# Patient Record
Sex: MALE | Race: WHITE | Employment: FULL TIME | URBAN - METROPOLITAN AREA
[De-identification: names, ages, dates, MRNs, and addresses within clinical notes are randomized per-mention and may not be internally consistent; named-entity substitution may affect disease eponyms.]

---

## 2018-11-15 ENCOUNTER — OFFICE VISIT (OUTPATIENT)
Dept: INTERNAL MEDICINE CLINIC | Age: 43
End: 2018-11-15
Payer: COMMERCIAL

## 2018-11-15 VITALS
TEMPERATURE: 97.9 F | DIASTOLIC BLOOD PRESSURE: 72 MMHG | HEART RATE: 121 BPM | WEIGHT: 181 LBS | SYSTOLIC BLOOD PRESSURE: 110 MMHG | OXYGEN SATURATION: 96 % | BODY MASS INDEX: 27.12 KG/M2

## 2018-11-15 DIAGNOSIS — N41.1 CHRONIC PROSTATITIS: Primary | ICD-10-CM

## 2018-11-15 DIAGNOSIS — G89.29 CHRONIC MIDLINE LOW BACK PAIN WITHOUT SCIATICA: ICD-10-CM

## 2018-11-15 DIAGNOSIS — M54.50 CHRONIC MIDLINE LOW BACK PAIN WITHOUT SCIATICA: ICD-10-CM

## 2018-11-15 LAB
BILIRUBIN, POC: NORMAL
BLOOD URINE, POC: NORMAL
CLARITY, POC: CLEAR
COLOR, POC: YELLOW
GLUCOSE URINE, POC: NORMAL
KETONES, POC: NORMAL
LEUKOCYTE EST, POC: NORMAL
NITRITE, POC: NORMAL
PH, POC: 6
PROTEIN, POC: NORMAL
SPECIFIC GRAVITY, POC: 1.02
UROBILINOGEN, POC: 0.2

## 2018-11-15 PROCEDURE — 99214 OFFICE O/P EST MOD 30 MIN: CPT | Performed by: INTERNAL MEDICINE

## 2018-11-15 PROCEDURE — 81002 URINALYSIS NONAUTO W/O SCOPE: CPT | Performed by: INTERNAL MEDICINE

## 2018-11-15 RX ORDER — TRAMADOL HYDROCHLORIDE 50 MG/1
50 TABLET ORAL EVERY 6 HOURS PRN
Qty: 20 TABLET | Refills: 0 | Status: SHIPPED | OUTPATIENT
Start: 2018-11-15 | End: 2018-11-27 | Stop reason: SDUPTHER

## 2018-11-15 RX ORDER — CIPROFLOXACIN 500 MG/1
500 TABLET, FILM COATED ORAL 2 TIMES DAILY
Qty: 60 TABLET | Refills: 0 | Status: SHIPPED | OUTPATIENT
Start: 2018-11-15 | End: 2018-11-25

## 2018-11-15 ASSESSMENT — ENCOUNTER SYMPTOMS
CONSTIPATION: 0
RESPIRATORY NEGATIVE: 1
BACK PAIN: 1
GASTROINTESTINAL NEGATIVE: 1

## 2018-11-15 NOTE — PROGRESS NOTES
Subjective:      Patient ID: Huy Rendon is a 37 y.o. male. Patient presents with:  Back Pain: lower back pain, radiates down into hips and down thighs. seen by chirpractor yesterday, not much relief. hx of prostatitis    Huy Rendon is a 37 y.o. male with the following history as recorded in [employer]:  Patient Active Problem List    Prostatitis      No current outpatient prescriptions on file. No current facility-administered medications for this visit. Allergies: Patient has no known allergies. Past Medical History:  2006: BPH  No date: Prostatitis  History reviewed. No pertinent surgical history. Review of patient's family history indicates:  Problem: Birth Defects      Relation: Paternal Grandmother       Age of Onset: 72       Comment: prostate cancer    Smoking status: Current Every Day Smoker                                                   Packs/day: 1.50      Years: 21.00     Smokeless tobacco: Never Used                      Alcohol use: Yes              Comment: occ        Back Pain   This is a recurrent problem. The problem occurs constantly. The problem has been gradually worsening since onset. The pain is present in the gluteal. The quality of the pain is described as aching. Radiates to: peroneal area. The pain is moderate. The symptoms are aggravated by standing. Review of Systems   Constitutional: Positive for fatigue. HENT: Negative. Respiratory: Negative. Cardiovascular: Negative. Gastrointestinal: Negative. Negative for constipation. Genitourinary: Positive for frequency. Negative for difficulty urinating. Musculoskeletal: Positive for back pain. Skin: Negative. Psychiatric/Behavioral: Negative. Objective:   Physical Exam   HENT:   Head: Normocephalic. Right Ear: External ear normal.   Left Ear: External ear normal.   Nose: Nose normal.   Mouth/Throat: Oropharynx is clear and moist. No oropharyngeal exudate.    Eyes: Pupils are equal,

## 2018-11-19 ENCOUNTER — TELEPHONE (OUTPATIENT)
Dept: INTERNAL MEDICINE CLINIC | Age: 43
End: 2018-11-19

## 2018-11-27 ENCOUNTER — OFFICE VISIT (OUTPATIENT)
Dept: INTERNAL MEDICINE CLINIC | Age: 43
End: 2018-11-27
Payer: COMMERCIAL

## 2018-11-27 VITALS
OXYGEN SATURATION: 96 % | SYSTOLIC BLOOD PRESSURE: 118 MMHG | HEART RATE: 83 BPM | WEIGHT: 181 LBS | DIASTOLIC BLOOD PRESSURE: 66 MMHG | BODY MASS INDEX: 27.12 KG/M2 | TEMPERATURE: 98.7 F

## 2018-11-27 DIAGNOSIS — N41.1 CHRONIC PROSTATITIS: Primary | ICD-10-CM

## 2018-11-27 LAB
BILIRUBIN, POC: NORMAL
BLOOD URINE, POC: NORMAL
CLARITY, POC: NORMAL
COLOR, POC: YELLOW
GLUCOSE URINE, POC: NORMAL
KETONES, POC: NORMAL
LEUKOCYTE EST, POC: NORMAL
NITRITE, POC: NORMAL
PH, POC: 5
PROTEIN, POC: NORMAL
SPECIFIC GRAVITY, POC: 1.02
UROBILINOGEN, POC: 0.2

## 2018-11-27 PROCEDURE — 99213 OFFICE O/P EST LOW 20 MIN: CPT | Performed by: INTERNAL MEDICINE

## 2018-11-27 PROCEDURE — 81002 URINALYSIS NONAUTO W/O SCOPE: CPT | Performed by: INTERNAL MEDICINE

## 2018-11-27 RX ORDER — DOXYCYCLINE HYCLATE 100 MG
100 TABLET ORAL 2 TIMES DAILY
Qty: 30 TABLET | Refills: 0 | Status: SHIPPED | OUTPATIENT
Start: 2018-11-27 | End: 2018-12-08 | Stop reason: SDUPTHER

## 2018-11-27 RX ORDER — TRAMADOL HYDROCHLORIDE 50 MG/1
50 TABLET ORAL EVERY 6 HOURS PRN
Qty: 20 TABLET | Refills: 0 | Status: SHIPPED | OUTPATIENT
Start: 2018-11-27 | End: 2018-12-02

## 2018-11-27 ASSESSMENT — PATIENT HEALTH QUESTIONNAIRE - PHQ9
SUM OF ALL RESPONSES TO PHQ QUESTIONS 1-9: 0
SUM OF ALL RESPONSES TO PHQ QUESTIONS 1-9: 0
1. LITTLE INTEREST OR PLEASURE IN DOING THINGS: 0
SUM OF ALL RESPONSES TO PHQ9 QUESTIONS 1 & 2: 0
2. FEELING DOWN, DEPRESSED OR HOPELESS: 0

## 2018-12-08 DIAGNOSIS — N41.1 CHRONIC PROSTATITIS: ICD-10-CM

## 2018-12-10 RX ORDER — DOXYCYCLINE HYCLATE 100 MG
TABLET ORAL
Qty: 30 TABLET | Refills: 0 | Status: SHIPPED | OUTPATIENT
Start: 2018-12-10 | End: 2019-01-07 | Stop reason: SDUPTHER

## 2019-01-07 DIAGNOSIS — N41.1 CHRONIC PROSTATITIS: ICD-10-CM

## 2019-01-07 RX ORDER — DOXYCYCLINE HYCLATE 100 MG
TABLET ORAL
Qty: 30 TABLET | Refills: 0 | Status: SHIPPED | OUTPATIENT
Start: 2019-01-07 | End: 2019-10-24

## 2019-04-11 NOTE — LETTER
[facility]  [address] [place] [id number]  Phone: [phone]  Fax: [phone]    Mickie Shelton MD        November 15, 2018     Patient: Jorge Michael   YOB: 1975   Date of Visit: 11/15/2018       To Whom It May Concern: It is my medical opinion that Constantino Pullman may return to work on 11-. .    If you have any questions or concerns, please don't hesitate to call.     Sincerely,          Mickie Shelton MD Eating, ambulating, has BMs.   Feels ready to go home.   Full DCS to follow.

## 2019-10-24 ENCOUNTER — OFFICE VISIT (OUTPATIENT)
Dept: INTERNAL MEDICINE CLINIC | Age: 44
End: 2019-10-24
Payer: COMMERCIAL

## 2019-10-24 VITALS
OXYGEN SATURATION: 97 % | BODY MASS INDEX: 28.02 KG/M2 | WEIGHT: 187 LBS | DIASTOLIC BLOOD PRESSURE: 76 MMHG | HEART RATE: 101 BPM | SYSTOLIC BLOOD PRESSURE: 124 MMHG | TEMPERATURE: 97.9 F

## 2019-10-24 DIAGNOSIS — L02.91 ABSCESS: Primary | ICD-10-CM

## 2019-10-24 DIAGNOSIS — R59.1 LYMPHADENOPATHY: ICD-10-CM

## 2019-10-24 PROCEDURE — 99214 OFFICE O/P EST MOD 30 MIN: CPT | Performed by: INTERNAL MEDICINE

## 2019-10-24 RX ORDER — HYDROCODONE BITARTRATE AND ACETAMINOPHEN 5; 300 MG/1; MG/1
1 TABLET ORAL 2 TIMES DAILY PRN
Qty: 6 TABLET | Refills: 0 | Status: SHIPPED | OUTPATIENT
Start: 2019-10-24 | End: 2019-10-27

## 2019-10-24 RX ORDER — SULFAMETHOXAZOLE AND TRIMETHOPRIM 800; 160 MG/1; MG/1
1 TABLET ORAL 2 TIMES DAILY
Qty: 14 TABLET | Refills: 0 | Status: SHIPPED | OUTPATIENT
Start: 2019-10-24 | End: 2019-11-21 | Stop reason: SDUPTHER

## 2019-10-24 RX ORDER — CEPHALEXIN 500 MG/1
500 CAPSULE ORAL 3 TIMES DAILY
Qty: 21 CAPSULE | Refills: 0 | Status: SHIPPED | OUTPATIENT
Start: 2019-10-24 | End: 2019-11-21 | Stop reason: SDUPTHER

## 2019-10-24 ASSESSMENT — ENCOUNTER SYMPTOMS
ABDOMINAL PAIN: 1
COLOR CHANGE: 1

## 2019-11-20 ENCOUNTER — TELEPHONE (OUTPATIENT)
Dept: INTERNAL MEDICINE CLINIC | Age: 44
End: 2019-11-20

## 2019-11-20 DIAGNOSIS — L02.91 ABSCESS: ICD-10-CM

## 2019-11-21 RX ORDER — SULFAMETHOXAZOLE AND TRIMETHOPRIM 800; 160 MG/1; MG/1
1 TABLET ORAL 2 TIMES DAILY
Qty: 14 TABLET | Refills: 0 | Status: SHIPPED | OUTPATIENT
Start: 2019-11-21 | End: 2019-11-28

## 2019-11-21 RX ORDER — CEPHALEXIN 500 MG/1
500 CAPSULE ORAL 3 TIMES DAILY
Qty: 21 CAPSULE | Refills: 0 | Status: SHIPPED | OUTPATIENT
Start: 2019-11-21 | End: 2019-11-28

## 2021-10-28 ENCOUNTER — OFFICE VISIT (OUTPATIENT)
Dept: FAMILY MEDICINE CLINIC | Age: 46
End: 2021-10-28
Payer: COMMERCIAL

## 2021-10-28 VITALS
OXYGEN SATURATION: 93 % | HEART RATE: 101 BPM | HEIGHT: 69 IN | TEMPERATURE: 99.6 F | WEIGHT: 185 LBS | SYSTOLIC BLOOD PRESSURE: 100 MMHG | DIASTOLIC BLOOD PRESSURE: 60 MMHG | BODY MASS INDEX: 27.4 KG/M2

## 2021-10-28 DIAGNOSIS — G89.18 POST-OP PAIN: Primary | ICD-10-CM

## 2021-10-28 DIAGNOSIS — T81.40XA POSTOPERATIVE INFECTION, UNSPECIFIED TYPE, INITIAL ENCOUNTER: ICD-10-CM

## 2021-10-28 PROCEDURE — 99213 OFFICE O/P EST LOW 20 MIN: CPT | Performed by: NURSE PRACTITIONER

## 2021-10-28 SDOH — ECONOMIC STABILITY: FOOD INSECURITY: WITHIN THE PAST 12 MONTHS, YOU WORRIED THAT YOUR FOOD WOULD RUN OUT BEFORE YOU GOT MONEY TO BUY MORE.: PATIENT DECLINED

## 2021-10-28 SDOH — ECONOMIC STABILITY: FOOD INSECURITY: WITHIN THE PAST 12 MONTHS, THE FOOD YOU BOUGHT JUST DIDN'T LAST AND YOU DIDN'T HAVE MONEY TO GET MORE.: PATIENT DECLINED

## 2021-10-28 ASSESSMENT — ENCOUNTER SYMPTOMS
NAUSEA: 0
CONSTIPATION: 0
SHORTNESS OF BREATH: 0
COUGH: 0
WHEEZING: 0
DIARRHEA: 0
ABDOMINAL PAIN: 1

## 2021-10-28 ASSESSMENT — PATIENT HEALTH QUESTIONNAIRE - PHQ9
SUM OF ALL RESPONSES TO PHQ QUESTIONS 1-9: 0
1. LITTLE INTEREST OR PLEASURE IN DOING THINGS: 0
SUM OF ALL RESPONSES TO PHQ9 QUESTIONS 1 & 2: 0
2. FEELING DOWN, DEPRESSED OR HOPELESS: 0
SUM OF ALL RESPONSES TO PHQ QUESTIONS 1-9: 0
SUM OF ALL RESPONSES TO PHQ QUESTIONS 1-9: 0

## 2021-10-28 ASSESSMENT — SOCIAL DETERMINANTS OF HEALTH (SDOH): HOW HARD IS IT FOR YOU TO PAY FOR THE VERY BASICS LIKE FOOD, HOUSING, MEDICAL CARE, AND HEATING?: PATIENT DECLINED

## 2021-10-28 NOTE — PROGRESS NOTES
HISTORY AND PHYSICAL             Date: 10/28/2021        Patient Name: Megan Durant     YOB: 1975      Age:  55 y.o. Chief Complaint     Chief Complaint   Patient presents with   Elena Brandon Doctor    Follow-up     had surgery 10-20-21 appedectomy, 10-23-21 had a repair         History Obtained From   patient    History of Present Illness   Presents today to establish care. States he had appendectomy 10/20 and had to have correction surgery on 10/23 released on Monday. States he has had ongoing abd pain, generally through out abd.  small amt of po intake, drinking some fluids, urinating ok. Last BM this morning, normal.  Denies n/v/d. Fevers at home, weakness and fatigue, states he lost 15 lbs over the last week. Post op follow up scheduled for tomorrow, phone follow up. Completed oral antibiotics on Monday. pain at rest 5/10, with movement increases 9/10. Past Medical History     Past Medical History:   Diagnosis Date    BPH 2006    Prostatitis         Past Surgical History   No past surgical history on file. Medications Prior to Admission     Prior to Admission medications    Not on File        Allergies   Patient has no known allergies. Social History     Social History     Tobacco History     Smoking Status  Current Every Day Smoker Smoking Frequency  1.5 packs/day for 21 years (31.5 pk yrs)    Smokeless Tobacco Use  Never Used          Alcohol History     Alcohol Use Status  Yes Comment  occ          Drug Use     Drug Use Status  No          Sexual Activity     Sexually Active  Yes Partners  Female                Family History     Family History   Problem Relation Age of Onset    Birth Defects Paternal Grandmother 72        prostate cancer       Review of Systems   Review of Systems   Constitutional: Positive for activity change, fatigue, fever and unexpected weight change (lost 15lbs over the week).    Respiratory: Negative for cough, shortness of breath and wheezing. Cardiovascular: Negative for chest pain, palpitations and leg swelling. Gastrointestinal: Positive for abdominal pain. Negative for constipation, diarrhea and nausea. Genitourinary: Negative for difficulty urinating. Skin: Positive for wound (surgical incision). Neurological: Positive for weakness. Physical Exam   /60 (Site: Left Upper Arm, Position: Sitting, Cuff Size: Medium Adult)   Pulse 101   Temp 99.6 °F (37.6 °C) (Oral)   Ht 5' 8.5\" (1.74 m)   Wt 185 lb (83.9 kg)   SpO2 93%   BMI 27.72 kg/m²     Physical Exam  Constitutional:       General: He is not in acute distress. Appearance: He is ill-appearing. HENT:      Head: Normocephalic and atraumatic. Cardiovascular:      Rate and Rhythm: Regular rhythm. Tachycardia present. Heart sounds: Normal heart sounds. No murmur heard. Pulmonary:      Effort: Tachypnea present. Abdominal:      General: There is distension. Tenderness: There is abdominal tenderness (throughout). There is guarding. Musculoskeletal:      Right lower leg: No edema. Left lower leg: No edema. Skin:     General: Skin is warm and dry. Neurological:      Mental Status: He is alert. Labs      Office Visit on 11/27/2018   Component Date Value Ref Range Status    Color, UA 11/27/2018 yellow   Final    Clarity, UA 11/27/2018 clearn   Final    Glucose, UA POC 11/27/2018 n   Final    Bilirubin, UA 11/27/2018 n   Final    Ketones, UA 11/27/2018 n   Final    Spec Grav, UA 11/27/2018 1.020   Final    Blood, UA POC 11/27/2018 n   Final    pH, UA 11/27/2018 5   Final    Protein, UA POC 11/27/2018 n   Final    Urobilinogen, UA 11/27/2018 0.2   Final    Leukocytes, UA 11/27/2018 n   Final    Nitrite, UA 11/27/2018 n   Final          Assessment & Plan   1. Post-op pain  No improvement since discharge from [facility], weight loss,     2.  Postoperative infection, unspecified type, initial encounter  Possible post op infection, fevers at home, tachycardia and tachypnea in office. Follow up with Dr. Merriam Burkitt or report to [facility] today. Follow up here in 4 weeks.           Electronically signed by ISAIAH Humphrey NP on 10/28/21 at 7:49 AM EDT